# Patient Record
Sex: FEMALE | Race: WHITE | URBAN - METROPOLITAN AREA
[De-identification: names, ages, dates, MRNs, and addresses within clinical notes are randomized per-mention and may not be internally consistent; named-entity substitution may affect disease eponyms.]

---

## 2023-01-09 ENCOUNTER — OFFICE VISIT (OUTPATIENT)
Dept: URBAN - METROPOLITAN AREA CLINIC 40 | Facility: CLINIC | Age: 43
End: 2023-01-09
Payer: COMMERCIAL

## 2023-01-09 ENCOUNTER — WEB ENCOUNTER (OUTPATIENT)
Dept: URBAN - METROPOLITAN AREA CLINIC 40 | Facility: CLINIC | Age: 43
End: 2023-01-09

## 2023-01-09 ENCOUNTER — LAB OUTSIDE AN ENCOUNTER (OUTPATIENT)
Dept: URBAN - METROPOLITAN AREA CLINIC 40 | Facility: CLINIC | Age: 43
End: 2023-01-09

## 2023-01-09 VITALS
SYSTOLIC BLOOD PRESSURE: 118 MMHG | DIASTOLIC BLOOD PRESSURE: 78 MMHG | TEMPERATURE: 98.2 F | HEIGHT: 65 IN | BODY MASS INDEX: 31.65 KG/M2 | WEIGHT: 190 LBS | HEART RATE: 78 BPM

## 2023-01-09 DIAGNOSIS — R10.11 RUQ PAIN: ICD-10-CM

## 2023-01-09 DIAGNOSIS — K11.7 HYPERSALIVATION: ICD-10-CM

## 2023-01-09 DIAGNOSIS — R07.89 NON-CARDIAC CHEST PAIN: ICD-10-CM

## 2023-01-09 PROCEDURE — 99204 OFFICE O/P NEW MOD 45 MIN: CPT | Performed by: INTERNAL MEDICINE

## 2023-01-09 RX ORDER — PANTOPRAZOLE SODIUM 40 MG/1
1 TABLET TABLET, DELAYED RELEASE ORAL ONCE A DAY
Qty: 30 | Refills: 2 | OUTPATIENT
Start: 2023-01-09

## 2023-01-09 NOTE — PHYSICAL EXAM GASTROINTESTINAL
Abdomen , soft, TTP in RUQ nondistended , no guarding or rigidity , no masses palpable , normal bowel sounds , Liver and Spleen , no hepatomegaly present , no hepatosplenomegaly , liver nontender , spleen not palpable

## 2023-01-09 NOTE — HPI-TODAY'S VISIT:
41 yo  female here for intermittent symptoms of pain that starts in mid upper back and radiates to her upper abdomen.  Has noticed increased salivation.  Tums does not help.  This has been going on for past 2 years, but they have been getting more frequent lately.  Usually happens in the morning.  She has been on metformin for the past 2 years as well.  She has had 3 episodes of COVID in the past 2 years.  Has altered sense of taste since COVID.  No prior upper endoscopy or colonoscopy.

## 2023-01-20 ENCOUNTER — OFFICE VISIT (OUTPATIENT)
Dept: URBAN - METROPOLITAN AREA CLINIC 39 | Facility: CLINIC | Age: 43
End: 2023-01-20
Payer: COMMERCIAL

## 2023-01-20 DIAGNOSIS — K80.20 CHOLELITHIASIS: ICD-10-CM

## 2023-01-20 PROCEDURE — 76705 ECHO EXAM OF ABDOMEN: CPT | Performed by: INTERNAL MEDICINE

## 2023-01-20 RX ORDER — PANTOPRAZOLE SODIUM 40 MG/1
1 TABLET TABLET, DELAYED RELEASE ORAL ONCE A DAY
Qty: 30 | Refills: 2 | Status: ACTIVE | COMMUNITY
Start: 2023-01-09

## 2023-01-23 PROBLEM — 266474003 CHOLELITHIASIS: Status: ACTIVE | Noted: 2023-01-23

## 2023-01-27 ENCOUNTER — OFFICE VISIT (OUTPATIENT)
Dept: URBAN - METROPOLITAN AREA CLINIC 40 | Facility: CLINIC | Age: 43
End: 2023-01-27
Payer: COMMERCIAL

## 2023-01-27 VITALS
HEIGHT: 65 IN | HEART RATE: 80 BPM | TEMPERATURE: 97.5 F | BODY MASS INDEX: 30.99 KG/M2 | WEIGHT: 186 LBS | DIASTOLIC BLOOD PRESSURE: 84 MMHG | SYSTOLIC BLOOD PRESSURE: 124 MMHG

## 2023-01-27 DIAGNOSIS — R07.89 NON-CARDIAC CHEST PAIN: ICD-10-CM

## 2023-01-27 DIAGNOSIS — K11.7 HYPERSALIVATION: ICD-10-CM

## 2023-01-27 DIAGNOSIS — R10.11 RUQ PAIN: ICD-10-CM

## 2023-01-27 PROCEDURE — 99214 OFFICE O/P EST MOD 30 MIN: CPT | Performed by: INTERNAL MEDICINE

## 2023-01-27 RX ORDER — PANTOPRAZOLE SODIUM 40 MG/1
1 TABLET TABLET, DELAYED RELEASE ORAL ONCE A DAY
Qty: 30 | Refills: 2 | OUTPATIENT

## 2023-01-27 RX ORDER — PANTOPRAZOLE SODIUM 40 MG/1
1 TABLET TABLET, DELAYED RELEASE ORAL ONCE A DAY
Qty: 30 | Refills: 2 | Status: ACTIVE | COMMUNITY
Start: 2023-01-09

## 2023-01-27 NOTE — HPI-TODAY'S VISIT:
41 yo  female here for intermittent symptoms of pain that starts in mid upper back and radiates to her upper abdomen.  Has noticed increased salivation.  Tums does not help.  This has been going on for past 2 years, but they have been getting more frequent lately.  Usually happens in the morning.  She has been on metformin for the past 2 years as well.  She has had 3 episodes of COVID in the past 2 years.  Has altered sense of taste since COVID.  No prior upper endoscopy or colonoscopy. RUQ u/s-gallstones

## 2023-01-28 PROBLEM — 53827007: Status: ACTIVE | Noted: 2023-01-09

## 2023-01-28 PROBLEM — 301717006: Status: ACTIVE | Noted: 2023-01-09

## 2023-02-02 ENCOUNTER — ERX REFILL RESPONSE (OUTPATIENT)
Dept: URBAN - METROPOLITAN AREA CLINIC 40 | Facility: CLINIC | Age: 43
End: 2023-02-02

## 2023-02-02 RX ORDER — PANTOPRAZOLE SODIUM 40 MG/1
TAKE 1 TABLET BY MOUTH EVERY DAY FOR 30 DAYS TABLET, DELAYED RELEASE ORAL
Qty: 30 TABLET | Refills: 2 | OUTPATIENT

## 2023-02-02 RX ORDER — PANTOPRAZOLE SODIUM 40 MG/1
1 TABLET TABLET, DELAYED RELEASE ORAL ONCE A DAY
Qty: 30 | Refills: 2 | OUTPATIENT

## 2023-02-20 ENCOUNTER — OFFICE VISIT (OUTPATIENT)
Dept: URBAN - METROPOLITAN AREA CLINIC 40 | Facility: CLINIC | Age: 43
End: 2023-02-20

## 2023-02-20 RX ORDER — PANTOPRAZOLE SODIUM 40 MG/1
TAKE 1 TABLET BY MOUTH EVERY DAY FOR 30 DAYS TABLET, DELAYED RELEASE ORAL
Qty: 30 TABLET | Refills: 2 | Status: ACTIVE | COMMUNITY

## 2023-02-20 RX ORDER — PANTOPRAZOLE SODIUM 40 MG/1
1 TABLET TABLET, DELAYED RELEASE ORAL ONCE A DAY
Qty: 30 | Refills: 2 | OUTPATIENT

## 2023-05-02 ENCOUNTER — ERX REFILL RESPONSE (OUTPATIENT)
Dept: URBAN - METROPOLITAN AREA CLINIC 40 | Facility: CLINIC | Age: 43
End: 2023-05-02

## 2023-05-02 RX ORDER — PANTOPRAZOLE SODIUM 40 MG/1
TAKE 1 TABLET BY MOUTH EVERY DAY FOR 30 DAYS TABLET, DELAYED RELEASE ORAL
Qty: 30 TABLET | Refills: 2 | OUTPATIENT

## 2023-05-02 RX ORDER — PANTOPRAZOLE SODIUM 40 MG/1
TAKE 1 TABLET BY MOUTH EVERY DAY TABLET, DELAYED RELEASE ORAL
Qty: 90 TABLET | Refills: 0 | OUTPATIENT

## 2024-04-29 ENCOUNTER — OV EP (OUTPATIENT)
Dept: URBAN - METROPOLITAN AREA CLINIC 40 | Facility: CLINIC | Age: 44
End: 2024-04-29
Payer: COMMERCIAL

## 2024-04-29 ENCOUNTER — LAB (OUTPATIENT)
Dept: URBAN - METROPOLITAN AREA CLINIC 40 | Facility: CLINIC | Age: 44
End: 2024-04-29

## 2024-04-29 VITALS
HEART RATE: 80 BPM | TEMPERATURE: 97.7 F | HEIGHT: 65 IN | BODY MASS INDEX: 27.49 KG/M2 | DIASTOLIC BLOOD PRESSURE: 72 MMHG | WEIGHT: 165 LBS | SYSTOLIC BLOOD PRESSURE: 118 MMHG

## 2024-04-29 DIAGNOSIS — K80.20 GALLSTONES: ICD-10-CM

## 2024-04-29 DIAGNOSIS — R07.89 NON-CARDIAC CHEST PAIN: ICD-10-CM

## 2024-04-29 DIAGNOSIS — K21.9 CHRONIC GERD: ICD-10-CM

## 2024-04-29 DIAGNOSIS — R63.5 WEIGHT GAIN: ICD-10-CM

## 2024-04-29 DIAGNOSIS — R10.11 RUQ PAIN: ICD-10-CM

## 2024-04-29 PROBLEM — 235919008: Status: ACTIVE | Noted: 2024-04-29

## 2024-04-29 PROBLEM — 235595009: Status: ACTIVE | Noted: 2024-04-29

## 2024-04-29 PROCEDURE — 99214 OFFICE O/P EST MOD 30 MIN: CPT | Performed by: INTERNAL MEDICINE

## 2024-04-29 RX ORDER — SEMAGLUTIDE 0.68 MG/ML
INJECTION, SOLUTION SUBCUTANEOUS
Qty: 9 MILLILITER | Status: ACTIVE | COMMUNITY

## 2024-04-29 RX ORDER — PANTOPRAZOLE SODIUM 40 MG/1
TAKE 1 TABLET BY MOUTH EVERY DAY TABLET, DELAYED RELEASE ORAL
Qty: 90 TABLET | Refills: 0 | Status: ON HOLD | COMMUNITY

## 2024-04-29 NOTE — HPI-TODAY'S VISIT:
44 yo  female known to Dr. Ann. Seen 1/2023 and 3/2023 by Dr. Ann for RUQ pain and noncardiac chest pain. No prior GI procedures. RUQ u/s 2023 confirmed gallstones and a surgery consult was recommended for Lap Norma, but not completed. Labs 5/2023, cbc/cmp normal. Wt gain noted. Tody she is  here for intermittent symptoms of pain that starts in mid upper back and radiates to her upper abdomen.  Has noticed increased salivation.  Tums does not help.  This has been going on for past 2 years, but they have been getting more frequent lately.  Usually happens in the morning.  She has been on metformin for the past 2 years as well.

## 2024-06-28 ENCOUNTER — OFFICE VISIT (OUTPATIENT)
Dept: URBAN - METROPOLITAN AREA SURGERY CENTER 30 | Facility: SURGERY CENTER | Age: 44
End: 2024-06-28

## 2024-07-29 ENCOUNTER — OFFICE VISIT (OUTPATIENT)
Dept: URBAN - METROPOLITAN AREA CLINIC 40 | Facility: CLINIC | Age: 44
End: 2024-07-29

## 2024-08-02 ENCOUNTER — OFFICE VISIT (OUTPATIENT)
Dept: URBAN - METROPOLITAN AREA CLINIC 40 | Facility: CLINIC | Age: 44
End: 2024-08-02

## 2024-08-30 ENCOUNTER — OFFICE VISIT (OUTPATIENT)
Dept: URBAN - METROPOLITAN AREA SURGERY CENTER 30 | Facility: SURGERY CENTER | Age: 44
End: 2024-08-30

## 2024-08-30 RX ORDER — SEMAGLUTIDE 0.68 MG/ML
INJECTION, SOLUTION SUBCUTANEOUS
Qty: 9 MILLILITER | Status: ACTIVE | COMMUNITY

## 2024-08-30 RX ORDER — PANTOPRAZOLE SODIUM 40 MG/1
1 TABLET TABLET, DELAYED RELEASE ORAL TWICE A DAY
Qty: 180 TABLET | Refills: 3 | OUTPATIENT
Start: 2024-08-30

## 2024-08-30 RX ORDER — SUCRALFATE 1 G/1
1 TABLET ON AN EMPTY STOMACH TABLET ORAL TWICE A DAY
Qty: 60 TABLET | Refills: 3 | OUTPATIENT
Start: 2024-08-30 | End: 2024-12-27

## 2024-08-30 RX ORDER — PANTOPRAZOLE SODIUM 40 MG/1
TAKE 1 TABLET BY MOUTH EVERY DAY TABLET, DELAYED RELEASE ORAL
Qty: 90 TABLET | Refills: 0 | Status: ON HOLD | COMMUNITY

## 2024-08-30 NOTE — HPI-TODAY'S VISIT:
Pt seen 4/2024 for epigastric and RUQ pain.  HIDA and EGD ordered, but she cancelled and states, "I chickened out."  Then pain recurred and she scheduled EGD, but not HIDA. EGD 8/30/24 confims severe gastritis with erosive gastritis. This may be the etiology of pain. PLAN: Protonix bid Carafate bid If pain persists then consider HIDA Avoid NSAIDs

## 2024-09-13 ENCOUNTER — OFFICE VISIT (OUTPATIENT)
Dept: URBAN - METROPOLITAN AREA CLINIC 40 | Facility: CLINIC | Age: 44
End: 2024-09-13
Payer: COMMERCIAL

## 2024-09-13 ENCOUNTER — DASHBOARD ENCOUNTERS (OUTPATIENT)
Age: 44
End: 2024-09-13

## 2024-09-13 VITALS
HEART RATE: 74 BPM | BODY MASS INDEX: 28.02 KG/M2 | DIASTOLIC BLOOD PRESSURE: 74 MMHG | WEIGHT: 168.2 LBS | HEIGHT: 65 IN | SYSTOLIC BLOOD PRESSURE: 106 MMHG | TEMPERATURE: 98.4 F

## 2024-09-13 DIAGNOSIS — R10.11 RUQ PAIN: ICD-10-CM

## 2024-09-13 DIAGNOSIS — K21.9 CHRONIC GERD: ICD-10-CM

## 2024-09-13 DIAGNOSIS — K80.20 GALLSTONES: ICD-10-CM

## 2024-09-13 DIAGNOSIS — R63.5 WEIGHT GAIN: ICD-10-CM

## 2024-09-13 DIAGNOSIS — R07.89 NON-CARDIAC CHEST PAIN: ICD-10-CM

## 2024-09-13 PROCEDURE — 99214 OFFICE O/P EST MOD 30 MIN: CPT | Performed by: INTERNAL MEDICINE

## 2024-09-13 RX ORDER — PANTOPRAZOLE SODIUM 40 MG/1
1 TABLET TABLET, DELAYED RELEASE ORAL TWICE A DAY
Qty: 180 TABLET | Refills: 3 | COMMUNITY
Start: 2024-08-30

## 2024-09-13 RX ORDER — SEMAGLUTIDE 0.68 MG/ML
INJECTION, SOLUTION SUBCUTANEOUS
Qty: 9 MILLILITER | COMMUNITY

## 2024-09-13 RX ORDER — SUCRALFATE 1 G/1
1 TABLET ON AN EMPTY STOMACH TABLET ORAL TWICE A DAY
Qty: 60 TABLET | Refills: 3 | OUTPATIENT
Start: 2024-09-13 | End: 2025-01-10

## 2024-09-13 RX ORDER — PANTOPRAZOLE SODIUM 40 MG/1
TAKE 1 TABLET BY MOUTH EVERY DAY TABLET, DELAYED RELEASE ORAL
Qty: 90 TABLET | Refills: 0 | Status: DISCONTINUED | COMMUNITY

## 2024-09-13 RX ORDER — SUCRALFATE 1 G/1
1 TABLET ON AN EMPTY STOMACH TABLET ORAL TWICE A DAY
Qty: 60 TABLET | Refills: 3 | COMMUNITY
Start: 2024-08-30 | End: 2024-12-27

## 2024-09-13 NOTE — HPI-TODAY'S VISIT:
Pt seen 4/2024 for epigastric and RUQ pain.  HIDA and EGD ordered, but she cancelled and states, "I chickened out."  Then pain recurred and she scheduled EGD, but not HIDA. EGD 8/30/24 confims severe gastritis with erosive gastritis. Bx negative for H.pylori. PLAN: Protonix bid Carafate bid If pain persists then consider HIDA  Avoid NSAIDs

## 2025-05-07 ENCOUNTER — P2P PATIENT RECORD (OUTPATIENT)
Age: 45
End: 2025-05-07